# Patient Record
Sex: FEMALE | Race: BLACK OR AFRICAN AMERICAN
[De-identification: names, ages, dates, MRNs, and addresses within clinical notes are randomized per-mention and may not be internally consistent; named-entity substitution may affect disease eponyms.]

---

## 2022-06-01 ENCOUNTER — NON-APPOINTMENT (OUTPATIENT)
Age: 47
End: 2022-06-01

## 2023-04-21 PROBLEM — Z00.00 ENCOUNTER FOR PREVENTIVE HEALTH EXAMINATION: Status: ACTIVE | Noted: 2023-04-21

## 2023-04-24 ENCOUNTER — NON-APPOINTMENT (OUTPATIENT)
Age: 48
End: 2023-04-24

## 2023-04-24 ENCOUNTER — APPOINTMENT (OUTPATIENT)
Dept: OTOLARYNGOLOGY | Facility: CLINIC | Age: 48
End: 2023-04-24
Payer: COMMERCIAL

## 2023-04-24 VITALS
DIASTOLIC BLOOD PRESSURE: 68 MMHG | HEIGHT: 64 IN | WEIGHT: 183 LBS | HEART RATE: 58 BPM | SYSTOLIC BLOOD PRESSURE: 107 MMHG | BODY MASS INDEX: 31.24 KG/M2

## 2023-04-24 DIAGNOSIS — Z78.9 OTHER SPECIFIED HEALTH STATUS: ICD-10-CM

## 2023-04-24 DIAGNOSIS — Z83.3 FAMILY HISTORY OF DIABETES MELLITUS: ICD-10-CM

## 2023-04-24 DIAGNOSIS — H69.81 OTHER SPECIFIED DISORDERS OF EUSTACHIAN TUBE, RIGHT EAR: ICD-10-CM

## 2023-04-24 DIAGNOSIS — H93.8X9 OTHER SPECIFIED DISORDERS OF EAR, UNSPECIFIED EAR: ICD-10-CM

## 2023-04-24 DIAGNOSIS — Z82.3 FAMILY HISTORY OF STROKE: ICD-10-CM

## 2023-04-24 DIAGNOSIS — J31.0 CHRONIC RHINITIS: ICD-10-CM

## 2023-04-24 DIAGNOSIS — E04.1 NONTOXIC SINGLE THYROID NODULE: ICD-10-CM

## 2023-04-24 PROCEDURE — 92550 TYMPANOMETRY & REFLEX THRESH: CPT

## 2023-04-24 PROCEDURE — 99204 OFFICE O/P NEW MOD 45 MIN: CPT

## 2023-04-24 PROCEDURE — 92557 COMPREHENSIVE HEARING TEST: CPT

## 2023-04-24 RX ORDER — AZELASTINE HYDROCHLORIDE AND FLUTICASONE PROPIONATE 137; 50 UG/1; UG/1
137-50 SPRAY, METERED NASAL
Qty: 1 | Refills: 5 | Status: ACTIVE | COMMUNITY
Start: 2023-04-24 | End: 1900-01-01

## 2023-04-24 NOTE — ADDENDUM
[FreeTextEntry1] : Documented by Tatiana Covarrubias acting as scribe for Dr. Menendez on 04/24/2023.\par \par All Medical record entries made by the scribe were at my, Dr. Menendez,direction and personally dictated by me on 04/24/2023. I have reviewed the chart and agree that the record accurately reflects my personal performance of the history, physical exam, assessment and plan. I have also personally directed, reviewed, and agreed with the discharge instructions.

## 2023-04-24 NOTE — ASSESSMENT
[FreeTextEntry1] : Reviewed and reconciled medications, allergies, PMHx, PSHx, SocHx, FMHx.\par \par Pt presents today c/o itching and draining of both ears, right seems to be worse than left. Denies tinnitus. Denies change in hearing. Pt notes sometimes she can feel popping in her ears. Pt notes sometimes she has a lot of wax and flaking when she has q tips. \par \par Physical Exam -\par thyroid nodule\par dry flaking in both ears\par inflamed and swollen turbs , mildly deviated septum \par \par Audio- \par TYPE A TYMPS AU\par (ETF ABNORMAL IN RIGHT, WNL IN LEFT) \par HEARING -8000HZ AU \par Rt ear - 45dB 100% discrim, TPP 20\par Lt ear - 50dB 100% discrim, TPP -30\par \par \par Plan: \par US thyroid ordered. Discussed r/b/a Aera Eustachian tube dilatation. Dymista - 1 spray bilaterally BID, spray/laterally.

## 2023-04-24 NOTE — DATA REVIEWED
[de-identified] : - TYPE A TYMPS AU\par (ETF ABNORMAL IN RIGHT, WNL IN LEFT) \par HEARING -8000HZ AU

## 2023-04-24 NOTE — HISTORY OF PRESENT ILLNESS
[de-identified] : Pt presents today c/o itching and draining of both ears, right seems to be worse than left. Denies tinnitus. Denies change in hearing. Pt notes sometimes she can feel popping in her ears. Pt notes sometimes she has a lot of wax and flaking when she has q tips.

## 2023-04-24 NOTE — PHYSICAL EXAM
[Hearing Choudhary Test (Tuning Fork On Forehead)] : no lateralization of tone [Midline] : trachea located in midline position [Normal] : orientation to person, place, and time: normal [de-identified] : thyroid nodule [de-identified] : m [FreeTextEntry8] : minimal dry flaking [FreeTextEntry9] : minimal dry flaking [de-identified] : mildly deviated septum  [de-identified] : inflamed and swollen turbs  [FreeTextEntry2] : sinuses nontender to percussion. sensations intact.  [de-identified] : class 1

## 2023-04-24 NOTE — CONSULT LETTER
[Dear  ___] : Dear  [unfilled], [Consult Letter:] : I had the pleasure of evaluating your patient, [unfilled]. [Please see my note below.] : Please see my note below. [Consult Closing:] : Thank you very much for allowing me to participate in the care of this patient.  If you have any questions, please do not hesitate to contact me. [Sincerely,] : Sincerely, [FreeTextEntry3] : Sergio Menendez MD FACS

## 2023-04-24 NOTE — REVIEW OF SYSTEMS
[Ear Pain] : ear pain [Ear Itch] : ear itch [Ear Drainage] : ear drainage [Easy Bruising] : a tendency for easy bruising [Negative] : Psychiatric [FreeTextEntry2] : headache, fatigue [FreeTextEntry7] : difficulty swallowing [de-identified] : sweating at night

## 2023-05-15 ENCOUNTER — TRANSCRIPTION ENCOUNTER (OUTPATIENT)
Age: 48
End: 2023-05-15